# Patient Record
Sex: FEMALE | Race: WHITE | NOT HISPANIC OR LATINO | Employment: OTHER | URBAN - METROPOLITAN AREA
[De-identification: names, ages, dates, MRNs, and addresses within clinical notes are randomized per-mention and may not be internally consistent; named-entity substitution may affect disease eponyms.]

---

## 2020-04-22 ENCOUNTER — PATIENT OUTREACH (OUTPATIENT)
Dept: SCHEDULING | Facility: IMAGING CENTER | Age: 85
End: 2020-04-22

## 2020-04-22 NOTE — PROGRESS NOTES
"Called Patient in regards to scheduling for AWV. Spoke to Nereyda her daughter, on behalf of the patient.   She stated she did not want to schedule at this time, until this Pandemic is over.   They will be calling to schedule in the future.     -Vicky \" Amanda \" P.   "